# Patient Record
Sex: MALE | Race: WHITE
[De-identification: names, ages, dates, MRNs, and addresses within clinical notes are randomized per-mention and may not be internally consistent; named-entity substitution may affect disease eponyms.]

---

## 2017-05-12 ENCOUNTER — HOSPITAL (OUTPATIENT)
Dept: OTHER | Age: 67
End: 2017-05-12
Attending: FAMILY MEDICINE

## 2017-06-02 ENCOUNTER — HOSPITAL (OUTPATIENT)
Dept: OTHER | Age: 67
End: 2017-06-02
Attending: INTERNAL MEDICINE

## 2017-06-02 LAB
% SATURATION: 39 % (ref 20–55)
A/G RATIO_: 1.6
ABS LYMPH: 1.9 K/CUMM (ref 1–3.5)
ABS MONO: 0.6 K/CUMM (ref 0.1–0.8)
ABS NEUTRO: 4.2 K/CUMM (ref 2–8)
ALBUMIN: 4.2 G/DL (ref 3.5–5)
ALK PHOS: 70 UNIT/L (ref 50–124)
ALT/GPT: 30 UNIT/L (ref 0–55)
ANION GAP SERPL CALC-SCNC: 15 MEQ/L (ref 10–20)
AST/GOT: 31 UNIT/L (ref 5–34)
BASOPHIL: 1 % (ref 0–1)
BILI TOTAL: 1.6 MG/DL (ref 0.2–1)
BUN SERPL-MCNC: 31 MG/DL (ref 6–20)
CALC TIBC: 323 UG/DL (ref 260–445)
CALCIUM: 9.5 MG/DL (ref 8.4–10.2)
CHLORIDE: 102 MEQ/L (ref 97–107)
CREATININE: 1.19 MG/DL (ref 0.6–1.3)
DIFF_TYPE?: ABNORMAL
EOSINOPHIL: 3 % (ref 0–6)
FERRITIN: 620 NG/ML (ref 22–275)
GLOBULIN_: 2.6 G/DL (ref 2–4.1)
GLUCOSE LVL: 104 MG/DL (ref 70–99)
HCT VFR BLD CALC: 37 % (ref 36–51)
HEMOLYSIS 1+: NEGATIVE
HEMOLYSIS 2+: NEGATIVE
HEMOLYSIS 2+: NEGATIVE
HEMOLYSIS 3+: NEGATIVE
HGB BLD-MCNC: 13.3 G/DL (ref 12–17)
IMMATURE GRAN: 0.8 % (ref 0–0.3)
INSTR WBC: 7.1 K/CUMM (ref 4–11)
IRON LVL: 127 UG/DL (ref 35–135)
LDH SERPL L TO P-CCNC: 240 UNIT/L (ref 125–220)
LIPEMIC 3+: NEGATIVE
LYMPHOCYTE: 27 %
MCH RBC QN AUTO: 33 PG (ref 25–35)
MCHC RBC AUTO-ENTMCNC: 36 G/DL (ref 32–37)
MCV RBC AUTO: 90 FL (ref 78–97)
MONOCYTE: 8 %
NEUTROPHIL: 60 %
NRBC BLD MANUAL-RTO: 0 % (ref 0–0.2)
PLATELET: 190 K/CUMM (ref 150–450)
POTASSIUM: 3.8 MEQ/L (ref 3.5–5.1)
RBC # BLD: 4.07 M/CUMM (ref 4.2–6)
RDW: 18.3 % (ref 11.5–14.5)
REF LAB RESULT: NORMAL
REF TEST NAME: NORMAL
SODIUM: 139 MEQ/L (ref 136–145)
TCO2: 26 MEQ/L (ref 19–29)
TESTOSTER TOT: 331.7 NG/DL (ref 280–1100)
TOTAL PROTEIN: 6.8 G/DL (ref 6.4–8.3)
TRANSFERRIN LEVEL: 231 MG/DL (ref 163–344)
WBC # BLD: 7.1 K/CUMM (ref 4–11)

## 2017-08-12 ENCOUNTER — HOSPITAL (OUTPATIENT)
Dept: OTHER | Age: 67
End: 2017-08-12
Attending: INTERNAL MEDICINE

## 2017-08-12 LAB
A/G RATIO_: 1.4
ABS LYMPH: 1.7 K/CUMM (ref 1–3.5)
ABS MONO: 0.5 K/CUMM (ref 0.1–0.8)
ABS NEUTRO: 4.3 K/CUMM (ref 2–8)
ALBUMIN: 4.2 G/DL (ref 3.5–5)
ALK PHOS: 66 UNIT/L (ref 50–124)
ALT/GPT: 34 UNIT/L (ref 0–55)
ANION GAP SERPL CALC-SCNC: 13 MEQ/L (ref 10–20)
AST/GOT: 37 UNIT/L (ref 5–34)
BASOPHIL: 0 % (ref 0–1)
BILI TOTAL: 1.8 MG/DL (ref 0.2–1)
BUN SERPL-MCNC: 33 MG/DL (ref 6–20)
CALCIUM: 9.4 MG/DL (ref 8.4–10.2)
CHLORIDE: 100 MEQ/L (ref 97–107)
CREATININE: 1.07 MG/DL (ref 0.6–1.3)
DIFF_TYPE?: ABNORMAL
EOSINOPHIL: 3 % (ref 0–6)
GLOBULIN_: 2.9 G/DL (ref 2–4.1)
GLUCOSE LVL: 98 MG/DL (ref 70–99)
HCT VFR BLD CALC: 38 % (ref 36–51)
HEMOLYSIS 2+: ABNORMAL
HGB BLD-MCNC: 14.1 G/DL (ref 12–17)
IMMATURE GRAN: 0.9 % (ref 0–0.3)
INSTR WBC: 6.7 K/CUMM (ref 4–11)
LIPEMIC 3+: NEGATIVE
LYMPHOCYTE: 25 %
MCH RBC QN AUTO: 32 PG (ref 25–35)
MCHC RBC AUTO-ENTMCNC: 37 G/DL (ref 32–37)
MCV RBC AUTO: 86 FL (ref 78–97)
MONOCYTE: 8 %
NEUTROPHIL: 63 %
NRBC BLD MANUAL-RTO: 0 % (ref 0–0.2)
PLATELET: 157 K/CUMM (ref 150–450)
POTASSIUM: 3.2 MEQ/L (ref 3.5–5.1)
RBC # BLD: 4.39 M/CUMM (ref 4.2–6)
RDW: 16.1 % (ref 11.5–14.5)
SODIUM: 138 MEQ/L (ref 136–145)
TCO2: 28 MEQ/L (ref 19–29)
TOTAL PROTEIN: 7.1 G/DL (ref 6.4–8.3)
WBC # BLD: 6.7 K/CUMM (ref 4–11)

## 2017-08-18 ENCOUNTER — HOSPITAL (OUTPATIENT)
Dept: OTHER | Age: 67
End: 2017-08-18
Attending: ORTHOPAEDIC SURGERY

## 2017-08-18 LAB
HEMOLYSIS 2+: NEGATIVE
POTASSIUM: 3.1 MEQ/L (ref 3.5–5.1)
SURG SPECIMEN: NORMAL

## 2017-08-19 LAB
ABS LYMPH: 1 K/CUMM (ref 1–3.5)
ABS MONO: 1.1 K/CUMM (ref 0.1–0.8)
ABS NEUTRO: 12.1 K/CUMM (ref 2–8)
ANION GAP SERPL CALC-SCNC: 10 MEQ/L (ref 10–20)
ANION GAP SERPL CALC-SCNC: 9 MEQ/L (ref 10–20)
BASOPHIL: 0 % (ref 0–1)
BUN SERPL-MCNC: 20 MG/DL (ref 6–20)
BUN SERPL-MCNC: 26 MG/DL (ref 6–20)
CALCIUM: 8.6 MG/DL (ref 8.4–10.2)
CALCIUM: 8.7 MG/DL (ref 8.4–10.2)
CHLORIDE: 98 MEQ/L (ref 97–107)
CHLORIDE: 98 MEQ/L (ref 97–107)
CREATININE: 1.21 MG/DL (ref 0.6–1.3)
CREATININE: 1.43 MG/DL (ref 0.6–1.3)
DIFF_TYPE?: ABNORMAL
EOSINOPHIL: 1 % (ref 0–6)
GLUCOSE LVL: 124 MG/DL (ref 70–99)
GLUCOSE LVL: 143 MG/DL (ref 70–99)
HCT VFR BLD CALC: 31 % (ref 36–51)
HEMOLYSIS 2+: NEGATIVE
HEMOLYSIS 2+: NEGATIVE
HGB BLD-MCNC: 11.5 G/DL (ref 12–17)
IMMATURE GRAN: 0.5 % (ref 0–0.3)
INSTR WBC: 14.3 K/CUMM (ref 4–11)
LYMPHOCYTE: 7 %
MCH RBC QN AUTO: 32 PG (ref 25–35)
MCHC RBC AUTO-ENTMCNC: 38 G/DL (ref 32–37)
MCV RBC AUTO: 86 FL (ref 78–97)
MONOCYTE: 7 %
NEUTROPHIL: 84 %
NRBC BLD MANUAL-RTO: 0 % (ref 0–0.2)
PLATELET: 147 K/CUMM (ref 150–450)
PLT ESTIMATE: ABNORMAL
POTASSIUM: 3.1 MEQ/L (ref 3.5–5.1)
POTASSIUM: 3.5 MEQ/L (ref 3.5–5.1)
RBC # BLD: 3.55 M/CUMM (ref 4.2–6)
RBC MORPH: ABNORMAL
RDW: 16 % (ref 11.5–14.5)
SODIUM: 133 MEQ/L (ref 136–145)
SODIUM: 134 MEQ/L (ref 136–145)
TCO2: 29 MEQ/L (ref 19–29)
TCO2: 30 MEQ/L (ref 19–29)
WBC # BLD: 14.3 K/CUMM (ref 4–11)

## 2017-08-20 LAB
A/G RATIO_: 1.1
ABS LYMPH: 0.6 K/CUMM (ref 1–3.5)
ABS MONO: 0.8 K/CUMM (ref 0.1–0.8)
ABS NEUTRO: 11.2 K/CUMM (ref 2–8)
ALBUMIN: 3.4 G/DL (ref 3.5–5)
ALK PHOS: 57 UNIT/L (ref 50–124)
ALT/GPT: 23 UNIT/L (ref 0–55)
ANION GAP SERPL CALC-SCNC: 10 MEQ/L (ref 10–20)
AST/GOT: 31 UNIT/L (ref 5–34)
BASOPHIL: 0 % (ref 0–1)
BILI TOTAL: 3 MG/DL (ref 0.2–1)
BUN SERPL-MCNC: 17 MG/DL (ref 6–20)
CALCIUM: 9.9 MG/DL (ref 8.4–10.2)
CHLORIDE: 98 MEQ/L (ref 97–107)
CREATININE: 0.99 MG/DL (ref 0.6–1.3)
DIFF_TYPE?: ABNORMAL
EOSINOPHIL: 1 % (ref 0–6)
GLOBULIN_: 3.1 G/DL (ref 2–4.1)
GLUCOSE LVL: 148 MG/DL (ref 70–99)
HCT VFR BLD CALC: 29 % (ref 36–51)
HEMOLYSIS 2+: NEGATIVE
HGB BLD-MCNC: 10.9 G/DL (ref 12–17)
IMMATURE GRAN: 0.7 % (ref 0–0.3)
INSTR WBC: 12.8 K/CUMM (ref 4–11)
LIPEMIC 3+: NEGATIVE
LYMPHOCYTE: 5 %
MCH RBC QN AUTO: 32 PG (ref 25–35)
MCHC RBC AUTO-ENTMCNC: 37 G/DL (ref 32–37)
MCV RBC AUTO: 86 FL (ref 78–97)
MONOCYTE: 6 %
NEUTROPHIL: 87 %
NRBC BLD MANUAL-RTO: 0 % (ref 0–0.2)
PLATELET: 142 K/CUMM (ref 150–450)
POTASSIUM: 3.7 MEQ/L (ref 3.5–5.1)
RBC # BLD: 3.41 M/CUMM (ref 4.2–6)
RDW: 16 % (ref 11.5–14.5)
SODIUM: 133 MEQ/L (ref 136–145)
TCO2: 29 MEQ/L (ref 19–29)
TOTAL PROTEIN: 6.5 G/DL (ref 6.4–8.3)
WBC # BLD: 12.8 K/CUMM (ref 4–11)

## 2017-08-21 LAB
A/G RATIO_: 1
ABS LYMPH: 1.3 K/CUMM (ref 1–3.5)
ABS MONO: 0.9 K/CUMM (ref 0.1–0.8)
ABS NEUTRO: 8.5 K/CUMM (ref 2–8)
ALBUMIN: 3.2 G/DL (ref 3.5–5)
ALK PHOS: 53 UNIT/L (ref 50–124)
ALT/GPT: 22 UNIT/L (ref 0–55)
AST/GOT: 25 UNIT/L (ref 5–34)
BASOPHIL: 0 % (ref 0–1)
BILI DIRECT: 0.8 MG/DL (ref 0–0.5)
BILI TOTAL: 1.8 MG/DL (ref 0.2–1)
DIFF_TYPE?: ABNORMAL
EOSINOPHIL: 3 % (ref 0–6)
GLOBULIN_: 3.2 G/DL (ref 2–4.1)
HCT VFR BLD CALC: 29 % (ref 36–51)
HEMOLYSIS 2+: NEGATIVE
HGB BLD-MCNC: 10.6 G/DL (ref 12–17)
IMMATURE GRAN: 0.4 % (ref 0–0.3)
INSTR WBC: 11.1 K/CUMM (ref 4–11)
LIPEMIC 2+: NEGATIVE
LYMPHOCYTE: 12 %
MCH RBC QN AUTO: 32 PG (ref 25–35)
MCHC RBC AUTO-ENTMCNC: 37 G/DL (ref 32–37)
MCV RBC AUTO: 85 FL (ref 78–97)
MONOCYTE: 8 %
NEUTROPHIL: 76 %
NRBC BLD MANUAL-RTO: 0 % (ref 0–0.2)
PLATELET: 131 K/CUMM (ref 150–450)
RBC # BLD: 3.37 M/CUMM (ref 4.2–6)
RDW: 15.8 % (ref 11.5–14.5)
TOTAL PROTEIN: 6.4 G/DL (ref 6.4–8.3)
WBC # BLD: 11.1 K/CUMM (ref 4–11)

## 2018-05-24 ENCOUNTER — HOSPITAL (OUTPATIENT)
Dept: OTHER | Age: 68
End: 2018-05-24
Attending: INTERNAL MEDICINE

## 2018-05-24 LAB
A/G RATIO_: 1.7
ABS LYMPH: 1.3 K/CUMM (ref 1–3.5)
ABS MONO: 0.5 K/CUMM (ref 0.1–0.8)
ABS NEUTRO: 4.1 K/CUMM (ref 2–8)
ALBUMIN: 4.3 G/DL (ref 3.5–5)
ALK PHOS: 68 UNIT/L (ref 50–124)
ALT/GPT: 17 UNIT/L (ref 0–55)
ANION GAP SERPL CALC-SCNC: 14 MEQ/L (ref 10–20)
AST/GOT: 24 UNIT/L (ref 5–34)
BASOPHIL: 1 % (ref 0–1)
BILI TOTAL: 2.4 MG/DL (ref 0.2–1)
BUN SERPL-MCNC: 34 MG/DL (ref 6–20)
CALCIUM: 9.9 MG/DL (ref 8.4–10.2)
CHLORIDE: 100 MEQ/L (ref 97–107)
CHOLESTEROL: 115 MG/DL
CREATININE: 1 MG/DL (ref 0.6–1.3)
DIFF_TYPE?: ABNORMAL
EOSINOPHIL: 2 % (ref 0–6)
GLOBULIN_: 2.5 G/DL (ref 2–4.1)
GLUCOSE LVL: 103 MG/DL (ref 70–99)
HCT VFR BLD CALC: 34 % (ref 36–51)
HDLC SERPL-MCNC: 31 MG/DL (ref 40–60)
HEMOLYSIS 2+: NEGATIVE
HEMOLYSIS 4+: NEGATIVE
HGB A1C: 4 %
HGB BLD-MCNC: 12.8 G/DL (ref 12–17)
IMMATURE GRAN: 0.5 % (ref 0–0.3)
INSTR WBC: 6 K/CUMM (ref 4–11)
LDLC SERPL CALC-MCNC: 74 MG/DL
LIPEMIC 3+: NEGATIVE
LYMPHOCYTE: 22 %
MCH RBC QN AUTO: 33 PG (ref 25–35)
MCHC RBC AUTO-ENTMCNC: 37 G/DL (ref 32–37)
MCV RBC AUTO: 87 FL (ref 78–97)
MONOCYTE: 8 %
NEUTROPHIL: 68 %
NRBC BLD MANUAL-RTO: 0 % (ref 0–0.2)
PLATELET: 155 K/CUMM (ref 150–450)
POTASSIUM: 3.6 MEQ/L (ref 3.5–5.1)
PSA LEVEL: 1.29 NG/ML (ref 0–4)
RBC # BLD: 3.92 M/CUMM (ref 4.2–6)
RDW: 15.9 % (ref 11.5–14.5)
SODIUM: 139 MEQ/L (ref 136–145)
TCO2: 29 MEQ/L (ref 19–29)
TOTAL PROTEIN: 6.8 G/DL (ref 6.4–8.3)
TRIGL SERPL-MCNC: 50 MG/DL
TSH SERPL-ACNC: 2.56 MIU/ML (ref 0.4–5)
WBC # BLD: 6 K/CUMM (ref 4–11)

## 2018-10-24 ENCOUNTER — HOSPITAL (OUTPATIENT)
Dept: OTHER | Age: 68
End: 2018-10-24
Attending: INTERNAL MEDICINE

## 2018-10-24 LAB
A/G RATIO_: 1.6
ALBUMIN: 4.2 G/DL (ref 3.5–5)
ALK PHOS: 67 UNIT/L (ref 50–124)
ALT/GPT: 19 UNIT/L (ref 0–55)
ANION GAP SERPL CALC-SCNC: 10 MEQ/L (ref 10–20)
AST/GOT: 23 UNIT/L (ref 5–34)
BILI TOTAL: 2.6 MG/DL (ref 0.2–1)
BUN SERPL-MCNC: 21 MG/DL (ref 6–20)
CALCIUM: 9.9 MG/DL (ref 8.4–10.2)
CHLORIDE: 100 MEQ/L (ref 97–107)
CHOLESTEROL: 119 MG/DL
CREATININE: 1.01 MG/DL (ref 0.6–1.3)
GLOBULIN_: 2.6 G/DL (ref 2–4.1)
GLUCOSE LVL: 110 MG/DL (ref 70–99)
HDLC SERPL-MCNC: 35 MG/DL (ref 40–60)
HEMOLYSIS 2+: NEGATIVE
LDLC SERPL CALC-MCNC: 73 MG/DL
LIPEMIC 3+: NEGATIVE
POTASSIUM: 3.7 MEQ/L (ref 3.5–5.1)
REF LAB RESULT: NORMAL
REF TEST NAME: NORMAL
SODIUM: 137 MEQ/L (ref 136–145)
TCO2: 31 MEQ/L (ref 19–29)
TOTAL PROTEIN: 6.8 G/DL (ref 6.4–8.3)
TRIGL SERPL-MCNC: 54 MG/DL

## 2018-10-27 ENCOUNTER — HOSPITAL (OUTPATIENT)
Dept: OTHER | Age: 68
End: 2018-10-27
Attending: INTERNAL MEDICINE

## 2018-10-27 LAB
A/G RATIO_: 1.6
ALBUMIN: 4.3 G/DL (ref 3.5–5)
ALK PHOS: 70 UNIT/L (ref 50–124)
ALT/GPT: 20 UNIT/L (ref 0–55)
ANION GAP SERPL CALC-SCNC: 11 MEQ/L (ref 10–20)
AST/GOT: 24 UNIT/L (ref 5–34)
BILI TOTAL: 2.6 MG/DL (ref 0.2–1)
BUN SERPL-MCNC: 35 MG/DL (ref 6–20)
CALCIUM: 9.9 MG/DL (ref 8.4–10.2)
CHLORIDE: 101 MEQ/L (ref 97–107)
CHOLESTEROL: 116 MG/DL
CREATININE: 1.13 MG/DL (ref 0.6–1.3)
GLOBULIN_: 2.7 G/DL (ref 2–4.1)
GLUCOSE LVL: 104 MG/DL (ref 70–99)
HDLC SERPL-MCNC: 33 MG/DL (ref 40–60)
HEMOLYSIS 2+: NEGATIVE
LDLC SERPL CALC-MCNC: 68 MG/DL
LIPEMIC 3+: NEGATIVE
POTASSIUM: 4 MEQ/L (ref 3.5–5.1)
REF LAB RESULT: NORMAL
REF TEST NAME: NORMAL
SODIUM: 138 MEQ/L (ref 136–145)
TCO2: 30 MEQ/L (ref 19–29)
TOTAL PROTEIN: 7 G/DL (ref 6.4–8.3)
TRIGL SERPL-MCNC: 73 MG/DL

## 2019-10-29 RX ORDER — FINASTERIDE 5 MG/1
TABLET, FILM COATED ORAL
Qty: 30 TABLET | Refills: 1 | Status: SHIPPED | OUTPATIENT
Start: 2019-10-29

## 2020-01-29 RX ORDER — FINASTERIDE 5 MG/1
TABLET, FILM COATED ORAL
Qty: 30 TABLET | Refills: 1 | OUTPATIENT
Start: 2020-01-29

## 2021-12-27 ENCOUNTER — APPOINTMENT (RX ONLY)
Dept: URBAN - METROPOLITAN AREA CLINIC 161 | Facility: CLINIC | Age: 71
Setting detail: DERMATOLOGY
End: 2021-12-27

## 2021-12-27 DIAGNOSIS — L81.4 OTHER MELANIN HYPERPIGMENTATION: ICD-10-CM | Status: STABLE

## 2021-12-27 DIAGNOSIS — D18.0 HEMANGIOMA: ICD-10-CM

## 2021-12-27 DIAGNOSIS — L85.3 XEROSIS CUTIS: ICD-10-CM | Status: INADEQUATELY CONTROLLED

## 2021-12-27 DIAGNOSIS — L82.1 OTHER SEBORRHEIC KERATOSIS: ICD-10-CM | Status: STABLE

## 2021-12-27 DIAGNOSIS — L57.8 OTHER SKIN CHANGES DUE TO CHRONIC EXPOSURE TO NONIONIZING RADIATION: ICD-10-CM | Status: STABLE

## 2021-12-27 DIAGNOSIS — Z12.83 ENCOUNTER FOR SCREENING FOR MALIGNANT NEOPLASM OF SKIN: ICD-10-CM

## 2021-12-27 PROBLEM — D18.01 HEMANGIOMA OF SKIN AND SUBCUTANEOUS TISSUE: Status: ACTIVE | Noted: 2021-12-27

## 2021-12-27 PROCEDURE — ? FULL BODY SKIN EXAM

## 2021-12-27 PROCEDURE — ? ADDITIONAL NOTES

## 2021-12-27 PROCEDURE — ? SUNSCREEN RECOMMENDATIONS

## 2021-12-27 PROCEDURE — 99203 OFFICE O/P NEW LOW 30 MIN: CPT

## 2021-12-27 PROCEDURE — ? TREATMENT REGIMEN

## 2021-12-27 PROCEDURE — ? COUNSELING

## 2021-12-27 ASSESSMENT — LOCATION DETAILED DESCRIPTION DERM
LOCATION DETAILED: LEFT INFERIOR MEDIAL UPPER BACK
LOCATION DETAILED: LEFT INFERIOR MEDIAL FOREHEAD
LOCATION DETAILED: RIGHT SUPERIOR UPPER BACK
LOCATION DETAILED: RIGHT MID-UPPER BACK
LOCATION DETAILED: RIGHT INFERIOR UPPER BACK
LOCATION DETAILED: RIGHT MEDIAL UPPER BACK
LOCATION DETAILED: LEFT POSTERIOR SHOULDER
LOCATION DETAILED: RIGHT POSTERIOR SHOULDER

## 2021-12-27 ASSESSMENT — LOCATION ZONE DERM
LOCATION ZONE: TRUNK
LOCATION ZONE: FACE
LOCATION ZONE: ARM

## 2021-12-27 ASSESSMENT — LOCATION SIMPLE DESCRIPTION DERM
LOCATION SIMPLE: LEFT UPPER BACK
LOCATION SIMPLE: LEFT FOREHEAD
LOCATION SIMPLE: RIGHT UPPER BACK
LOCATION SIMPLE: LEFT SHOULDER
LOCATION SIMPLE: RIGHT SHOULDER

## 2021-12-27 NOTE — PROCEDURE: TREATMENT REGIMEN
Otc Regimen: Recommend OTC Sunscreen 30 spf or greater q am and PRN exposure to UV light
Detail Level: Zone
Action 1: Continue